# Patient Record
Sex: FEMALE | Race: WHITE
[De-identification: names, ages, dates, MRNs, and addresses within clinical notes are randomized per-mention and may not be internally consistent; named-entity substitution may affect disease eponyms.]

---

## 2017-11-09 ENCOUNTER — HOSPITAL ENCOUNTER (OUTPATIENT)
Dept: HOSPITAL 46 - DS | Age: 47
Discharge: HOME | End: 2017-11-09
Attending: GENERAL PRACTICE
Payer: COMMERCIAL

## 2017-11-09 VITALS — BODY MASS INDEX: 33.32 KG/M2 | WEIGHT: 200 LBS | HEIGHT: 65 IN

## 2017-11-09 DIAGNOSIS — N83.8: ICD-10-CM

## 2017-11-09 DIAGNOSIS — Z79.899: ICD-10-CM

## 2017-11-09 DIAGNOSIS — I10: ICD-10-CM

## 2017-11-09 DIAGNOSIS — D27.1: Primary | ICD-10-CM

## 2017-11-09 DIAGNOSIS — N83.12: ICD-10-CM

## 2017-11-09 DIAGNOSIS — E89.0: ICD-10-CM

## 2017-11-09 DIAGNOSIS — Z88.0: ICD-10-CM

## 2017-11-09 DIAGNOSIS — Z98.890: ICD-10-CM

## 2017-11-09 DIAGNOSIS — F32.9: ICD-10-CM

## 2017-11-09 PROCEDURE — 0UB74ZZ EXCISION OF BILATERAL FALLOPIAN TUBES, PERCUTANEOUS ENDOSCOPIC APPROACH: ICD-10-PCS | Performed by: GENERAL PRACTICE

## 2017-11-09 PROCEDURE — 0UB14ZZ EXCISION OF LEFT OVARY, PERCUTANEOUS ENDOSCOPIC APPROACH: ICD-10-PCS | Performed by: GENERAL PRACTICE

## 2017-11-09 NOTE — NUR
1450: PATIENT BACK IN DAY SURGERY ROOM FROM PACU. C/O PAIN 4/10, BUT DECLINES
PAIN MEDICATION AT THIS TIME. ABDOMINAL SCOPE SITES X 3 WITH BANDAIDS, CDI. IV
SITE WNL. SCDs ON. ICE WATER PLACED AT BEDSIDE. CALL LIGHT WITHIN REACH.
 AT BEDSIDE.
1550: PATIENT C/O 6/10 PAIN. MEDICATED WITH IV MORPHINE. THEN GIVEN JELLO AND
CRACKERS TO TRY. TOLERATING WATER. VS CHECKED. CALL LIGHT WITHIN REACH.
 AT BEDSIDE.

## 2017-11-09 NOTE — NUR
AMB TO BR VOID 50 MLS WITH BLOOD, BLADDER SCANNED FRO 15MLS. CALL TO DR BRISENO AND TO KEEP PT X2 HRS FOR MOR VOID.

## 2017-11-10 NOTE — OR
[facility]
                                    [address] [place], [place]  [address]
_________________________________________________________________________________________
                                                                 Signed   
 
 
DATE OF OPERATION:
11/09/2017
 
SURGEON:
Kevin Currie MD
 
PREOPERATIVE DIAGNOSES:
Pelvic pain, left ovarian mass.
 
POSTOPERATIVE DIAGNOSES:
Pelvic pain, left ovarian mass, tubal metaplasia.
 
PROCEDURES:
Laparoscopic left salpingo-oophorectomy and right salpingectomy.
 
ASSISTANT:
Dr. Vang.
 
ANESTHESIA:
General.
 
ESTIMATED BLOOD LOSS:
50 mL.
 
COMPLICATIONS:
None.
 
DRAINS:
None.
 
FINDINGS:
Cervix, thick, closed.  Uterus, normal size and shape.  Uterine cavity sounding 
approximately 8 cm.  Anterior cul-de-sac was free of any endometriosis or adhesions.  
Posterior cul-de-sac was free of any endometriosis or adhesions.  The left pelvic 
sidewall was free of any endometriosis or adhesions.  However, the left sigmoid was 
slightly elevated and covering the left infundibulopelvic ligament.  The left tube was 
normal in length and normal pink fimbriated end.  Fimbriated end had small amount of 
metaplasia along the tube.  Left ovary was only slightly enlarged, had cyst present on 
the posterior aspect, approximately 2 cm in diameter.  The outer surface of the ovary 
appeared normal.  The right pelvic sidewall appeared normal with no adhesions or lesions.
 The right tube was normal in length and normal pink fimbriated end, but a large amount 
of metaplasia along the middle and distal length of the tube.  Right ovary is normal size
 
    Electronically Signed By: KEVIN CURRIE MD  11/10/17 1419
_________________________________________________________________________________________
PATIENT NAME:     KONSTANTIN ISABEL                      
MEDICAL RECORD #: [id number]                     OPERATIVE REPORT              
          ACCT #: [id number]  
DATE OF BIRTH:   [birth year]                                       
PHYSICIAN:   KEVIN CURRIE MD           REPORT #: [id number]
REPORT IS CONFIDENTIAL AND NOT TO BE RELEASED WITHOUT AUTHORIZATION
 
 
                                  [facility]
                                    [address], [place]  [address]
_________________________________________________________________________________________
                                                                 Signed   
 
 
and shape without any evidence of endometriosis or adhesions.  Rest of the pelvis was 
free of any masses or adhesions.
 
DESCRIPTION OF PROCEDURE:
The patient was brought into the operating room and placed in supine position.  After 
adequate general anesthesia was obtained, she was placed in a dorsal lithotomy position. 
Prepped and draped in usual sterile fashion.  A weighted speculum was placed in the 
vagina and the anterior lip of the cervix was grasped with an Allis clamp.  Uterine 
cavity was sounded to 8 cm and then cervix serially dilated.  A Hulka clamp was carefully
induced in the endocervical canal into the uterine cavity, and then attached to the 
anterior lip of the cervix.  The Allis clamp and weighted speculum were removed.  Hubbard 
catheter was placed in the bladder. 
 
A small infraumbilical skin incision was made after injecting the area with 0.5% Marcaine
with epinephrine.  The subcutaneous tissue was dissected with Metzenbaum scissors.  The 
fascia was identified, grasped with hemostats, elevated, and extended in a transverse 
fashion using Metzenbaum scissors.  Retention stitches of 0-Vicryl suture was placed 
above and below the incision.  The abdominal musculature and peritoneum were bluntly 
opened with finger dissection.  An S retractor was inserted into the incision and spun in
360-degree fashion and no masses and good placement in the abdominal cavity.  The Ruperto 
cannula and sleeve were entered the abdomen along the side of the S retractor, and the S 
retractor removed.  The sleeve was tied in place with the retention stitches and trocar 
removed.  The laparoscopic video attachment entered the abdomen under direct 
visualization.  Carbon dioxide was used as distending medium.  The above findings were 
noted.  On the left side just below the level of the umbilicus approximately 10 cm 
lateral to the midline.  A small skin incision was made with a scalpel after 
transilluminating the abdominal wall to avoid any vessels injecting the area with 0.5% 
Marcaine with epinephrine.  A 5 mm ablated trocar and sleeve entered the abdomen under 
direct visualization.  The trocar was removed and blunt probe inserted.  On the right 
side, same thing was done just below the level of the umbilicus approximately 10 cm 
lateral to the midline.  A 0.5% Marcaine with epinephrine was used to inject the skin 
after transilluminating the abdominal wall.  A small skin incision was made with a 
scalpel and then a 5-mm ablated trocar and sleeve entered the abdomen under direct 
visualization.  The trocar was removed and a 2nd blunt probe inserted.  The above 
findings were confirmed.  The LigaSure [place] bipolar cautery forceps brought in the 
operating were placed through the sleeve.  The peritoneum above elevated sigmoid colon 
was carefully grasped, cauterized, and cut dropping the sigmoid away from the 
infundibulopelvic ligament.  Care was taken to stay well away from the bowel itself.  
Good hemostasis was noted.  At this point, the ligature forceps were used to cauterize 
the infundibulopelvic ligament in several places, and the ligament then cut and Endoloop 
of 0-Monocryl was then placed around the infundibulopelvic ligament pedicle and tightened
in place.  The tag was cut with laparoscopic scissors.  Good hemostasis was noted.  The 
 
    Electronically Signed By: KEVIN CURRIE MD  11/10/17 1419
_________________________________________________________________________________________
PATIENT NAME:     KONSTANTIN ISABEL                      
MEDICAL RECORD #: [id number]                     OPERATIVE REPORT              
          ACCT #: [id number]  
DATE OF BIRTH:   [birth year]                                       
PHYSICIAN:   KEVIN CURRIE MD           REPORT #: [id number]
REPORT IS CONFIDENTIAL AND NOT TO BE RELEASED WITHOUT AUTHORIZATION
 
 
                                  [facility]
                                    [address], [place]  [address]
_________________________________________________________________________________________
                                                                 Signed   
 
 
remainder of the left tube and ovary were cauterized down toward the utero-ovarian 
ligament and fallopian tube, and the fallopian tube and utero-ovarian ligament were 
individually cauterized in several places and cut freeing the tube and ovary.  A 5-mm 
laparoscope was placed in the side port and Endopouch placed through the infraumbilical 
10-mm port in the left tube and ovary placed in the Endopouch, and the pouch pulled up to
the infraumbilical incision.  The Ruperto sleeve was removed.  The bag with the tube and 
ovary pulled out through the incision. The Ruperto cannula and sleeve were placed back 
into the same fashion and again tied in place.  Laparoscope was reintroduced into the 
abdomen.  The entire pelvis was irrigated, noted to have good hemostasis.  Because the 
right tube had significant amount of metaplasia, which did appear abnormal, this decided 
to remove the right fallopian tube, so the LigaSure forceps were used to cauterize the 
mesosalpinx along the length of the tube until reaching the proximal tube making sure to 
get past all the metaplasia and the tube cauterized in several places and then cut.  The 
tube was pulled out through the midline port in one piece with slight amount of bleeding 
along this edge and right at the tubal junction and this was controlled by using the 
[place] bipolar forceps to cauterize the edges of the dissection.  The entire pelvis was
irrigated and noted to have good hemostasis.  Because of the raw nature of the dissection
and the slight oozing from the right side, it was decided to place some Evicel along the 
dissection edges and this was done, again good hemostasis was noted.  The gas was allowed
to escape lowering in the pressure and the areas of dissection were examined and noted to
have good hemostasis under low pressure.  All instruments were removed.  The gas allowed 
to escape and the final sleeve removed.  The infraumbilical incision was closed using a 
running stitch of oh Vicryl suture with 2 retention stitches tied together for further 
support.  The 3 skin incisions were closed using in the subcuticular stitches of 4-0 
Vicryl suture.  Hulka clamp and Hubbard were removed.  The patient tolerated the procedure 
well, went to recovery room in good condition.  The sponge, needle, and instrument count 
correct at end of procedure.  The left tube and ovary and the right fallopian tube were 
all sent to Pathology for identification.
 
 
 
 
________________________________________
 
 
Kevin Currie MD 
 
 
 
MJB/MODL
Job #:  [id number]
DD:  11/09/2017 14:19:01
 
    Electronically Signed By: KEVIN CURRIE MD  11/10/17 1419
_________________________________________________________________________________________
PATIENT NAME:     KONSTANTIN ISABEL                      
MEDICAL RECORD #: [id number]                     OPERATIVE REPORT              
          ACCT #: [id number]  
DATE OF BIRTH:   [birth year]                                       
PHYSICIAN:   KEVIN CURRIE MD           REPORT #: [id number]
REPORT IS CONFIDENTIAL AND NOT TO BE RELEASED WITHOUT AUTHORIZATION
 
 
                                  [facility]
                                    [address] [place]  [address]
_________________________________________________________________________________________
                                                                 Signed   
 
 
DT:  11/09/2017 22:09:26
 
cc:
Samson Jose MD
 
 
 
 
 
 
 
 
 
 
 
 
 
 
 
 
 
 
 
 
 
 
 
 
 
 
 
 
 
 
 
 
 
 
 
 
 
 
 
    Electronically Signed By: KEVIN CURRIE MD  11/10/17 1419
_________________________________________________________________________________________
PATIENT NAME:     KONSTANTIN ISABEL                      
MEDICAL RECORD #: [id number]                     OPERATIVE REPORT              
          ACCT #: [id number]  
DATE OF BIRTH:   [birth year]                                       
PHYSICIAN:   KEVIN CURRIE MD           REPORT #: [id number]
REPORT IS CONFIDENTIAL AND NOT TO BE RELEASED WITHOUT AUTHORIZATION

## 2018-06-04 ENCOUNTER — HOSPITAL ENCOUNTER (EMERGENCY)
Dept: HOSPITAL 46 - ED | Age: 48
Discharge: HOME | End: 2018-06-04
Payer: COMMERCIAL

## 2018-06-04 VITALS — BODY MASS INDEX: 32.49 KG/M2 | HEIGHT: 65 IN | WEIGHT: 195 LBS

## 2018-06-04 DIAGNOSIS — Z88.5: ICD-10-CM

## 2018-06-04 DIAGNOSIS — E03.9: ICD-10-CM

## 2018-06-04 DIAGNOSIS — I10: ICD-10-CM

## 2018-06-04 DIAGNOSIS — Z88.0: ICD-10-CM

## 2018-06-04 DIAGNOSIS — R00.2: Primary | ICD-10-CM

## 2018-06-04 DIAGNOSIS — Z88.8: ICD-10-CM

## 2018-06-04 DIAGNOSIS — Z79.899: ICD-10-CM

## 2018-06-04 NOTE — EKG
[facility]
                                    [address]
                                  [place], [place]  [address]
_________________________________________________________________________________________
                                                                 Signed   
 
 
Sinus tachycardia
Otherwise normal ECG
When compared with ECG of 02-NOV-2017 16:02,
No significant change was found
Confirmed by TODD KU MD ([id number]) on 6/4/2018 9:10:50 PM
 
 
 
 
 
 
 
 
 
 
 
 
 
 
 
 
 
 
 
 
 
 
 
 
 
 
 
 
 
 
 
 
 
 
 
 
 
 
 
 
    Electronically Signed By: TODD KU MD  06/04/18 2111
_________________________________________________________________________________________
PATIENT NAME:     KONSTANTIN ISABEL                      
MEDICAL RECORD #: [id number]                     Electrocardiogram             
          ACCT #: [id number]  
DATE OF BIRTH:   [birth year]                                       
PHYSICIAN:   TODD KU MD           REPORT #: [id number]
REPORT IS CONFIDENTIAL AND NOT TO BE RELEASED WITHOUT AUTHORIZATION